# Patient Record
Sex: FEMALE | Race: BLACK OR AFRICAN AMERICAN
[De-identification: names, ages, dates, MRNs, and addresses within clinical notes are randomized per-mention and may not be internally consistent; named-entity substitution may affect disease eponyms.]

---

## 2017-03-24 NOTE — EMERGENCY ROOM REPORT
History of Present Illness


General


Chief Complaint:  Back Pain-No Injury


Source:  Patient





Present Illness


HPI


Patient present with complaints of low back pain


She states that she knows when there is a bladder infection he can also have 

back pain and she was concerning came to the ER


Denies any frequency denies any fevers or chills denies any fall or trauma the 

discomfort as a cramping sensation across the lower back





Patient is sexually active however with one partner


Denies any vaginal discharge


Allergies:  


Coded Allergies:  


     No Known Allergies (Unverified , 6/9/16)





Patient History


Past Medical History:  see triage record


Pertinent Family History:  none


Last Menstrual Period:  FEB 16,2017


Reviewed Nursing Documentation:  PMH: Agreed, PSxH: Agreed





Nursing Documentation-PMH


Past Medical History:  No Stated History





Review of Systems


All Other Systems:  negative except mentioned in HPI





Physical Exam





Vital Signs








  Date Time  Temp Pulse Resp B/P Pulse Ox O2 Delivery O2 Flow Rate FiO2


 


3/23/17 21:24 98.2 82 16 111/70 99 Room Air  








Sp02 EP Interpretation:  reviewed, normal


General Appearance:  well appearing, no apparent distress


Head:  normocephalic, atraumatic


Eyes:  bilateral eye EOMI, bilateral eye PERRL


ENT:  normal pharynx, no angioedema


Neck:  full range of motion, supple


Respiratory:  lungs clear


Gastrointestinal:  non tender, soft, no mass


Musculoskeletal:  normal inspection, back normal - No obvious focal weakness 

nontender midline,


Neurologic:  alert, oriented x3


Skin:  normal color, no rash


Lymphatic:  no adenopathy





Medical Decision Making


Diagnostic Impression:  


 Primary Impression:  


 uti


ER Course


Patient's urine sample did show bacteria





Patient is treated symptomatically


At this time is appropriate for initial conservative outpatient





Labs








Test


  3/23/17


21:35


 


Urine Color Pale yellow 


 


Urine Appearance Clear 


 


Urine pH 6 (4.5-8.0) 


 


Urine Specific Gravity


  1.020


(1.005-1.035)


 


Urine Protein


  Negative


(NEGATIVE)


 


Urine Glucose (UA)


  Negative


(NEGATIVE)


 


Urine Ketones


  Negative


(NEGATIVE)


 


Urine Occult Blood 1+ (NEGATIVE) 


 


Urine Nitrite


  Negative


(NEGATIVE)


 


Urine Bilirubin


  Negative


(NEGATIVE)


 


Urine Urobilinogen


  Normal MG/DL


(0.0-1.0)


 


Urine Leukocyte Esterase 3+ (NEGATIVE) 


 


Urine RBC


  2-4 /HPF (0 -


2)


 


Urine WBC


  5-10 /HPF (0 -


2)


 


Urine Squamous Epithelial


Cells Few /LPF


(NONE/OCC)


 


Urine Bacteria


  Moderate /HPF


(NONE)


 


Urine HCG, Qualitative Negative 











Last Vital Signs








  Date Time  Temp Pulse Resp B/P Pulse Ox O2 Delivery O2 Flow Rate FiO2


 


3/23/17 22:07 97.5 77 14 105/74 100 Room Air  








Status:  improved


Disposition:  HOME, SELF-CARE


Condition:  Stable


Scripts


Ibuprofen* (MOTRIN*) 600 Mg Tablet


600 MG ORAL Q8H Y for For Pain, #20 TAB 0 Refills


   Prov: ARLEN FELIZ D.O.         3/23/17 


Nitrofurantoin Monohyd/M-Cryst* (MACROBID 100 MG*) 100 Mg Capsule


100 MG ORAL EVERY 12 HOURS for 7 Days, CAP


   Prov: ARLEN FELIZ D.O.         3/23/17


Referrals:  


[facility],REFERRING (PCP)


Patient Instructions:  Urinary Tract Infection, Easy-to-Read





Additional Instructions:  


Patient is provided with the discharge instructions notified to follow up with 

primary doctor in the next 2-3 days otherwise return to the er with any 

worsening symptoms.


Please note that this report is being documented using DRAGON technology.  This 

can lead to erroneous entry secondary to incorrect interpretation by the 

dictating instrument.











ARLEN FELIZ D.O. Mar 24, 2017 00:20

## 2017-03-30 NOTE — EMERGENCY ROOM REPORT
History of Present Illness


General


Chief Complaint:  Back Pain-No Injury


Source:  Patient





Present Illness


HPI


This is a 22-year-old female with no past medical history.  She presents with 

chief complaint of lower back pain.  Onset for last couple days.  She bent over 

to  something and felt pain in the left lower back.  No incontinence of 

bowel or urine.  Pain is 9/10.  Worse with movement.  No anesthesia.  No trauma 

or fever.  Similar symptom in the past but slightly worse.  No relief with 

ibuprofen.


Allergies:  


Coded Allergies:  


     No Known Allergies (Unverified , 6/9/16)





Patient History


Past Medical History:  none


Past Surgical History:  none


Pertinent Family History:  none


Social History:  Denies: smoking


Last Menstrual Period:  5 days ago


Pregnant Now:  No


Immunizations:  other


Reviewed Nursing Documentation:  PMH: Agreed, PSxH: Agreed





Nursing Documentation-PMH


Past Medical History:  No Stated History


Hx Neurological Problems:  Yes - MIGRAINE





Review of Systems


Eye:  Denies: blurred vision, eye pain


ENT:  Denies: ear pain, nose congestion, throat swelling


Respiratory:  Denies: cough, shortness of breath


Cardiovascular:  Denies: chest pain, palpitations


Gastrointestinal:  Denies: abdominal pain, diarrhea, nausea, vomiting


Musculoskeletal:  Reports: back pain, Denies: joint pain


Skin:  Denies: rash


Neurological:  Denies: headache, numbness


Endocrine:  Denies: increased thirst, increased urine


Hematologic/Lymphatic:  Denies: easy bruising


All Other Systems:  negative except mentioned in HPI





Physical Exam





Vital Signs








  Date Time  Temp Pulse Resp B/P Pulse Ox O2 Delivery O2 Flow Rate FiO2


 


3/29/17 23:31 97.7 88 16 100/62 100   


 


3/29/17 23:40      Room Air  





vitals normal


Sp02 EP Interpretation:  reviewed, normal


General Appearance:  well appearing, no apparent distress, alert


Head:  normocephalic, atraumatic


Eyes:  bilateral eye EOMI, bilateral eye PERRL


ENT:  hearing grossly normal, normal pharynx


Neck:  full range of motion, supple, no meningismus


Respiratory:  chest non-tender, lungs clear, normal breath sounds


Cardiovascular #1:  regular rate, rhythm, no murmur


Gastrointestinal:  normal bowel sounds, non tender, no mass, no organomegaly, 

no bruit, non-distended


Musculoskeletal:  gait/station normal, normal range of motion, other - TTP over 

left lower lumbar area. no midline tenderness. no step off.


Psychiatric:  mood/affect normal


Skin:  warm/dry





Medical Decision Making


Diagnostic Impression:  


 Primary Impression:  


 Low back pain


 Qualified Codes:  M54.5 - Low back pain


ER Course


Patient with lumbar strain.  No evidence of cauda equina syndrome or spinal 

epidural abscess.  No evidence of neoplastic process.  We'll discharge home.





Last Vital Signs








  Date Time  Temp Pulse Resp B/P Pulse Ox O2 Delivery O2 Flow Rate FiO2


 


3/29/17 23:40 97.7 88 16 100/62 100 Room Air  








Status:  improved


Disposition:  HOME, SELF-CARE


Condition:  Stable


Scripts


Hydrocodone/Acetaminophen 5-325* (HYDROCODONE/ACETAMINOPHEN 5-325*) 1 Each 

Tablet


1 TAB ORAL Q6H Y for For Pain, #10 TAB 0 Refills


   Prov: DARCI FLORIAN M.D.         3/30/17


Referrals:  


[facility],REFERRING (PCP)


Patient Instructions:  Back Pain, Adult





Additional Instructions:  


Followup with your Dr. in 2-3 days.  Return if symptom worsen.











DARCI FLORIAN M.D. Mar 30, 2017 00:38

## 2017-04-08 NOTE — EMERGENCY ROOM REPORT
History of Present Illness


General


Chief Complaint:  Headache


Source:  Patient





Present Illness


HPI


22-year-old female presents to ED for evaluation.  States she's having a 

headache, bodyaches with cough x2 days.  Subjective fever.  Afebrile in triage.

  Patient has history of migraines.  States pain is throbbing, behind both eyes

, 6/10, nonradiating.  Notes photophobia.  Denies blurred vision.  Denies 

nausea or vomiting.  Denies neck stiffness.  States cough is dry.  Denies 

shortness of breath or chest pain.  She states she was here a few days ago for 

treatment of bodyaches and treatment of headache.  States the medications are 

not helping.  She states normally her best treatment is sleep.  No other 

aggravating relieving factors area denies any other associated symptom


Allergies:  


Coded Allergies:  


     No Known Allergies (Unverified , 6/9/16)





Patient History


Past Medical History:  migraines


Past Surgical History:  none


Pertinent Family History:  none


Social History:  Denies: alcohol use, drug use, smoking


Last Menstrual Period:  march 20, 2017


Pregnant Now:  No


Immunizations:  UTD


Reviewed Nursing Documentation:  PMH: Agreed, PSxH: Agreed





Nursing Documentation-PMH


Past Medical History:  No Stated History


Hx Neurological Problems:  Yes - MIGRAINE





Review of Systems


All Other Systems:  negative except mentioned in HPI





Physical Exam





Vital Signs








  Date Time  Temp Pulse Resp B/P Pulse Ox O2 Delivery O2 Flow Rate FiO2


 


4/6/17 20:48 98.2 73 15 108/75 98 Room Air  








Sp02 EP Interpretation:  reviewed, normal


General Appearance:  no apparent distress, alert, GCS 15, non-toxic


Head:  normocephalic, atraumatic


Eyes:  bilateral eye EOMI, bilateral eye PERRL, bilateral eye normal inspection

, bilateral eye visual acuity


ENT:  hearing grossly normal, normal pharynx, no angioedema, normal voice, TMs 

+ canals normal


Neck:  full range of motion, no meningismus, supple/symm/no masses


Respiratory:  chest non-tender, lungs clear, normal breath sounds, speaking 

full sentences


Cardiovascular #1:  regular rate, rhythm, no edema


Cardiovascular #2:  2+ carotid (R), 2+ carotid (L), 2+ radial (R), 2+ radial (L)

, 2+ dorsalis pedis (R), 2+ dorsalis pedis (L)


Gastrointestinal:  normal bowel sounds, non tender, soft, non-distended, no 

guarding, no rebound


Rectal:  deferred


Genitourinary:  normal inspection, no CVA tenderness


Musculoskeletal:  back normal, gait/station normal, normal range of motion, non-

tender


Neurologic:  alert, oriented x3, responsive, motor strength/tone normal, 

sensory intact, speech normal


Psychiatric:  judgement/insight normal, memory normal, mood/affect normal, no 

suicidal/homicidal ideation


Reflexes:  3+ bicep (R), 3+ bicep (L), 3+ tricep (R), 3+ tricep (L), 3+ knee (R)

, 3+ knee (L)


Skin:  normal color, no rash, warm/dry, well hydrated


Lymphatic:  no adenopathy





Medical Decision Making


Diagnostic Impression:  


 Primary Impression:  


 Headache


 Qualified Codes:  R51 - Headache


 Additional Impression:  


 Low back pain


 Qualified Codes:  M54.5 - Low back pain


ER Course


Hospital Course 


22-year-old female presents to ED complaining of headache, cough, bodyaches.  

History of migraines





Differential diagnoses include: URI, pharyngitis, otitis media, asthma 





Clinical course


Patient placed on stretcher.  After initial history, physical exam reveals a 

young female in no acute distress.  Bilateral TM unremarkable.  No pharyngeal 

erythema.  No tonsillar exudates.  No lymphadenopathy.  lungs clear. abdomen 

soft.  No focal neurological deficits





I reviewed EMR.  Patient has 2 recent visits to ER this month.  One for 

migraine and one for back pain.  Patient was discharged with appropriate pain 

medication.  States the medications are not helping.





Patient states her best treatment for migraine is sleep.  States she has been 

unable to sleep recently because of stress at work.  Asked patient what can I 

offer her that could help her the best.  Patient states the best solution is 

for her to go home and sleep.  Did not want any pain medication at this time.





Diagnosis - headache, low back





Stable and discharged home.  Take recently prescribed medications as directed. 

Instructed to followup with PMD.  Return to ED if symptoms recur or worsen





Last Vital Signs








  Date Time  Temp Pulse Resp B/P Pulse Ox O2 Delivery O2 Flow Rate FiO2


 


4/6/17 22:04 98.2  15 108/75 98 Room Air  


 


4/6/17 22:03  76      








Status:  improved


Disposition:  HOME, SELF-CARE


Condition:  Stable


Departure Forms:  Return to Work      Return to Work Date:  Apr 8, 2017


   Work Restrictions:  No Heavy Lifting


Patient Instructions:  Migraine Headache











ANNE PALOMINO M.D. Apr 8, 2017 07:18

## 2017-05-14 NOTE — EMERGENCY ROOM REPORT
History of Present Illness


General


Chief Complaint:  Headache


Source:  Patient





Present Illness


HPI


Is a 22-year-old female with a history of migraine.  She did come in 

frequently.  She's been having migraine for last 2 days.  Described as 

throbbing nature.  On the right side.  Has photophobia and sonophobia with it.  

Felt nauseous but no vomiting.  Better with sleep and Motrin.  More frequently 

in the last 2 days because she working 2 jobs and not getting much sleep.  

Denies any other complaint.  No fever or chills.  No focal deficit.  Similar to 

previous migraine.  Onset was gradual.


Allergies:  


Coded Allergies:  


     No Known Allergies (Unverified , 6/9/16)





Patient History


Past Medical History:  see triage record, old chart reviewed, migraines


Past Surgical History:  other


Pertinent Family History:  none


Social History:  Denies: smoking


Last Menstrual Period:  3/24/17


Pregnant Now:  No


Immunizations:  other


Reviewed Nursing Documentation:  PMH: Agreed, PSxH: Agreed





Nursing Documentation-PMH


Past Medical History:  No Stated History





Review of Systems


Eye:  Denies: blurred vision, eye pain


ENT:  Denies: ear pain, nose congestion, throat swelling


Respiratory:  Denies: cough, shortness of breath


Cardiovascular:  Denies: chest pain, palpitations


Gastrointestinal:  Denies: abdominal pain, diarrhea, nausea, vomiting


Musculoskeletal:  Denies: back pain, joint pain


Skin:  Denies: rash


Neurological:  Reports: headache, Denies: numbness


Endocrine:  Denies: increased thirst, increased urine


Hematologic/Lymphatic:  Denies: easy bruising


All Other Systems:  negative except mentioned in HPI





Physical Exam





Vital Signs








  Date Time  Temp Pulse Resp B/P Pulse Ox O2 Delivery O2 Flow Rate FiO2


 


3/25/17 22:24 97.5 84 15 104/62 99 Room Air  





vitals normal


Sp02 EP Interpretation:  reviewed, normal


General Appearance:  well appearing, no apparent distress, alert


Head:  normocephalic, atraumatic


Eyes:  bilateral eye EOMI, bilateral eye PERRL


ENT:  hearing grossly normal, normal pharynx


Neck:  full range of motion, supple, no meningismus


Respiratory:  chest non-tender, lungs clear, normal breath sounds


Cardiovascular #1:  regular rate, rhythm, no murmur


Gastrointestinal:  normal bowel sounds, non tender, no mass, no organomegaly, 

no bruit, non-distended


Musculoskeletal:  back normal, gait/station normal, normal range of motion


Psychiatric:  mood/affect normal


Skin:  warm/dry





Medical Decision Making


Diagnostic Impression:  


 Primary Impression:  


 Migraine


 Qualified Codes:  G43.009 - Migraine without aura, not intractable, without 

status migrainosus


ER Course


She presents with symptoms consistent with classical migraine.  No evidence of 

meningitis or bleed.  No family history of subarachnoid bleed.  No evidence of 

meningitis or neoplastic process.  We'll discharge home.





Last Vital Signs








  Date Time  Temp Pulse Resp B/P Pulse Ox O2 Delivery O2 Flow Rate FiO2


 


3/25/17 22:24 97.5 84 15 104/62 99 Room Air  








Status:  improved


Disposition:  HOME, SELF-CARE


Condition:  Stable


Scripts


Sumatriptan Succinate* (IMITREX*) 50 Mg Tablet


50 MG ORAL DAILY PRN MIGRAINE, #15 TAB


   Prov: DARCI FLORIAN M.D.         3/25/17


Patient Instructions:  Migraine Headache





Additional Instructions:  


Followup with your DrJerome in 7 days.  Return if symptom worsen.











DARCI FLORIAN M.D. Mar 25, 2017 22:43
NECK PAIN/BACK PAIN

## 2018-11-13 ENCOUNTER — HOSPITAL ENCOUNTER (EMERGENCY)
Dept: HOSPITAL 72 - EMR | Age: 23
Discharge: HOME | End: 2018-11-13
Payer: SELF-PAY

## 2018-11-13 VITALS — WEIGHT: 100 LBS | BODY MASS INDEX: 18.4 KG/M2 | HEIGHT: 62 IN

## 2018-11-13 VITALS — DIASTOLIC BLOOD PRESSURE: 74 MMHG | SYSTOLIC BLOOD PRESSURE: 108 MMHG

## 2018-11-13 VITALS — SYSTOLIC BLOOD PRESSURE: 108 MMHG | DIASTOLIC BLOOD PRESSURE: 74 MMHG

## 2018-11-13 DIAGNOSIS — L02.31: Primary | ICD-10-CM

## 2018-11-13 PROCEDURE — 99283 EMERGENCY DEPT VISIT LOW MDM: CPT

## 2018-11-13 NOTE — EMERGENCY ROOM REPORT
History of Present Illness


General


Chief Complaint:  Skin Rash/Abscess


Source:  Patient





Present Illness


HPI


Patient presents with complaints of pain and swelling buttock area ongoing for 

the past one month


Denies any fevers or chills denies any chest pain denies any discharge


Area is more uncomfortable on touch also sitting


Denies any discharge





Denies any difficulty with defecation


Denies any abdominal pain


Allergies:  


Coded Allergies:  


     No Known Allergies (Unverified , 6/9/16)





Patient History


Past Medical History:  see triage record


Pertinent Family History:  none


Last Menstrual Period:  10/2018


Pregnant Now:  No


Reviewed Nursing Documentation:  PMH: Agreed; PSxH: Agreed





Nursing Documentation-PMH


Past Medical History:  No History, Except For


Hx Neurological Problems:  Yes - MIGRAINE





Review of Systems


All Other Systems:  negative except mentioned in HPI





Physical Exam





Vital Signs








  Date Time  Temp Pulse Resp B/P (MAP) Pulse Ox O2 Delivery O2 Flow Rate FiO2


 


11/13/18 01:57 98.1 81 16 108/74 98 Room Air  








Sp02 EP Interpretation:  reviewed, normal


General Appearance:  well appearing, no apparent distress


Head:  normocephalic, atraumatic


Eyes:  bilateral eye PERRL, bilateral eye EOMI


ENT:  normal pharynx, no angioedema


Neck:  supple


Respiratory:  lungs clear


Cardiovascular #1:  regular rate, rhythm


Gastrointestinal:  non tender, soft


Rectal:  other - Small area approximately 1 cm x 1 cm mild fluctuance left fold 

of the buttock area, no obvious connection with rectal region


Musculoskeletal:  normal inspection


Neurologic:  alert, oriented x3


Skin:  other - As above


Lymphatic:  no adenopathy





Medical Decision Making


Diagnostic Impression:  


 Primary Impression:  


 Abscess


ER Course


Patient presents with what feels to be small palpable localized abscess


Area is small in finding


There is no obvious areas for incision at this time


Patient will have initial antibiotic coverage soaking and return with any 

changes or concerns





Last Vital Signs








  Date Time  Temp Pulse Resp B/P (MAP) Pulse Ox O2 Delivery O2 Flow Rate FiO2


 


11/13/18 02:34 98.1 78 18 108/74 98 Room Air  








Status:  unchanged


Disposition:  HOME, SELF-CARE


Condition:  Stable


Scripts


Ibuprofen* (MOTRIN*) 600 Mg Tablet


600 MG ORAL Q8H PRN for For Pain, #20 TAB 0 Refills


   Prov: Pete Lynn DO         11/13/18 


Trimethoprim/Sulfamethoxazole 160/800* (BACTRIM DS TABLET*) 1 Each Tablet


1 TAB ORAL Q12H, #14 TAB 0 Refills


   Prov: Pete Lynn DO         11/13/18 


Cephalexin* (KEFLEX*) 500 Mg Capsule


500 MG ORAL EVERY 6 HOURS for 7 Days, CAP


   Prov: Pete Lynn DO         11/13/18


Referrals:  


NOT CHOSEN [facility],REFERRING (PCP)


Departure Forms:  Return to Work   Return to Work in (Days):  2


   Return to Work Date:  Nov 15, 2018


Patient Instructions:  Abscess





Additional Instructions:  


Patient is provided with the discharge instructions notified to follow up with 

primary doctor in the next 2-3 days otherwise return to the er with any 

worsening symptoms.


Please note that this report is being documented using DRAGON technology.  This 

can lead to erroneous entry secondary to incorrect interpretation by the 

dictating instrument.











Pete Lynn DO Nov 13, 2018 05:06

## 2019-02-01 ENCOUNTER — HOSPITAL ENCOUNTER (EMERGENCY)
Dept: HOSPITAL 72 - EMR | Age: 24
Discharge: HOME | End: 2019-02-01
Payer: SELF-PAY

## 2019-02-01 VITALS — BODY MASS INDEX: 17.66 KG/M2 | HEIGHT: 62 IN | WEIGHT: 96 LBS

## 2019-02-01 VITALS — DIASTOLIC BLOOD PRESSURE: 74 MMHG | SYSTOLIC BLOOD PRESSURE: 118 MMHG

## 2019-02-01 DIAGNOSIS — O21.0: Primary | ICD-10-CM

## 2019-02-01 DIAGNOSIS — Z3A.01: ICD-10-CM

## 2019-02-01 PROCEDURE — 99283 EMERGENCY DEPT VISIT LOW MDM: CPT

## 2019-02-01 PROCEDURE — 81025 URINE PREGNANCY TEST: CPT

## 2019-02-01 NOTE — EMERGENCY ROOM REPORT
History of Present Illness


General


Chief Complaint:  Pregnancy Complications


Source:  Patient





Present Illness


HPI


Patient is a 24-year-old female presented after increased nausea and vomiting.  

Patient reports being approximately 6 weeks pregnant.  She reports having last 

menstrual period .  She is  patient has not had any prenatal 

care.  Patient was seen in the emergency department for similar symptoms.  She 

reports having 2 episodes of emesis.  She denies any diarrhea.  She denies any 

abdominal pain.  Patient states she did not feel dizzy or lightheaded.Patient 

reports having a recent ultrasound which showed intrauterine pregnancy at 6 

weeks


Allergies:  


Coded Allergies:  


     No Known Allergies (Unverified , 16)





Patient History


Last Menstrual Period:  2018


Pregnant Now:  Yes - 6 weeks


:  1


Para:  0


Reviewed Nursing Documentation:  PMH: Agreed; PSxH: Agreed





Nursing Documentation-PMH


Past Medical History:  No History, Except For


Hx Neurological Problems:  Yes - MIGRAINE





Review of Systems


All Other Systems:  negative except mentioned in HPI





Physical Exam





Vital Signs








  Date Time  Temp Pulse Resp B/P (MAP) Pulse Ox O2 Delivery O2 Flow Rate FiO2


 


19 05:01 98.1 96 16 118/74 98 Room Air  








General Appearance:  well appearing, no apparent distress, alert, GCS 15, non-

toxic


Head:  normocephalic, atraumatic


ENT:  hearing grossly normal, normal voice


Neck:  full range of motion, supple


Respiratory:  no respiratory distress, speaking full sentences


Cardiovascular #1:  normal inspection, normal peripheral pulses, regular rate, 

rhythm


Gastrointestinal:  normal inspection


Musculoskeletal:  normal inspection, no calf tenderness


Neurologic:  normal inspection, alert, oriented x3, normal gait


Psychiatric:  mood/affect normal


Skin:  no rash





Medical Decision Making


Diagnostic Impression:  


 Primary Impression:  


 Pregnancy


 Additional Impression:  


 Hyperemesis


ER Course


Patient presented for abdominal pain. Differential diagnoses included 

hyperemesis gravidarum, ischemic bowel, appendicitis, perforated viscus, 

abdominal aortic aneurysm, inferior myocardial infarction, viral 

gastroenteritis.    Patient has a benign exam and does not appear to require 

any further imaging or laboratory testing at this time.    Patient was offered 

antiemetics which she declined.  Patient was noted to have prior prescription 

for prenatal vitamins as well as antiemetic.  Patient was given additional 

prescription for Zofran ODT.  Patient is advised to follow-up with her 

previously scheduled OB appointment in 4 days.  She was advised to return if 

she began having worsening pain persistent vomiting or other concerns





Last Vital Signs








  Date Time  Temp Pulse Resp B/P (MAP) Pulse Ox O2 Delivery O2 Flow Rate FiO2


 


19 05:01 98.1 96 16 118/74 98 Room Air  








Status:  improved


Disposition:  HOME, SELF-CARE


Condition:  Stable


Scripts


Ondansetron Odt* (ZOFRAN ODT*) 4 Mg Tab.rapdis


4 MG BC EVERY 8 HOURS for nausea, #10 TAB 0 Refills


   Prov: Iker Thorne MD         19


Patient Instructions:  Hyperemesis Gravidarum











Iker Thorne MD 2019 05:43